# Patient Record
Sex: FEMALE | Race: WHITE
[De-identification: names, ages, dates, MRNs, and addresses within clinical notes are randomized per-mention and may not be internally consistent; named-entity substitution may affect disease eponyms.]

---

## 2020-01-02 ENCOUNTER — HOSPITAL ENCOUNTER (OUTPATIENT)
Dept: HOSPITAL 47 - LABPAT | Age: 60
Discharge: HOME | End: 2020-01-02
Attending: ORTHOPAEDIC SURGERY
Payer: COMMERCIAL

## 2020-01-02 DIAGNOSIS — Z01.818: Primary | ICD-10-CM

## 2020-01-02 DIAGNOSIS — M17.12: ICD-10-CM

## 2020-01-02 DIAGNOSIS — Z01.812: ICD-10-CM

## 2020-01-02 DIAGNOSIS — Z79.01: ICD-10-CM

## 2020-01-02 LAB
ANION GAP SERPL CALC-SCNC: 11 MMOL/L
BASOPHILS # BLD AUTO: 0.1 K/UL (ref 0–0.2)
BASOPHILS NFR BLD AUTO: 1 %
CHLORIDE SERPL-SCNC: 106 MMOL/L (ref 98–107)
CO2 SERPL-SCNC: 24 MMOL/L (ref 22–30)
EOSINOPHIL # BLD AUTO: 0.2 K/UL (ref 0–0.7)
EOSINOPHIL NFR BLD AUTO: 2 %
ERYTHROCYTE [DISTWIDTH] IN BLOOD BY AUTOMATED COUNT: 4.67 M/UL (ref 3.8–5.4)
ERYTHROCYTE [DISTWIDTH] IN BLOOD: 14.1 % (ref 11.5–15.5)
HCT VFR BLD AUTO: 42.3 % (ref 34–46)
HGB BLD-MCNC: 13.4 GM/DL (ref 11.4–16)
INR PPP: 0.9 (ref ?–1.2)
LYMPHOCYTES # SPEC AUTO: 1.8 K/UL (ref 1–4.8)
LYMPHOCYTES NFR SPEC AUTO: 18 %
MCH RBC QN AUTO: 28.7 PG (ref 25–35)
MCHC RBC AUTO-ENTMCNC: 31.7 G/DL (ref 31–37)
MCV RBC AUTO: 90.5 FL (ref 80–100)
MONOCYTES # BLD AUTO: 0.7 K/UL (ref 0–1)
MONOCYTES NFR BLD AUTO: 7 %
NEUTROPHILS # BLD AUTO: 6.9 K/UL (ref 1.3–7.7)
NEUTROPHILS NFR BLD AUTO: 70 %
PLATELET # BLD AUTO: 325 K/UL (ref 150–450)
POTASSIUM SERPL-SCNC: 4.7 MMOL/L (ref 3.5–5.1)
PT BLD: 9.5 SEC (ref 9–12)
SODIUM SERPL-SCNC: 141 MMOL/L (ref 137–145)
WBC # BLD AUTO: 9.8 K/UL (ref 3.8–10.6)

## 2020-01-02 PROCEDURE — 87070 CULTURE OTHR SPECIMN AEROBIC: CPT

## 2020-01-02 PROCEDURE — 36415 COLL VENOUS BLD VENIPUNCTURE: CPT

## 2020-01-02 PROCEDURE — 80051 ELECTROLYTE PANEL: CPT

## 2020-01-02 PROCEDURE — 85025 COMPLETE CBC W/AUTO DIFF WBC: CPT

## 2020-01-02 PROCEDURE — 85610 PROTHROMBIN TIME: CPT

## 2020-01-02 PROCEDURE — 93005 ELECTROCARDIOGRAM TRACING: CPT

## 2020-01-19 NOTE — HP
HISTORY AND PHYSICAL



REASON FOR ADMISSION:

Surgery 01/20/2020



HISTORY OF PRESENT ILLNESS:

Lashanda Hathaway is a 59-year-old patient seen with symptomatic left knee

osteoarthritis.  We discussed options for treatment.  She elected to proceed with left

total knee arthroplasty.  Consent was obtained.  Medical clearance was provided by Dr. Erin Verde.



PAST MEDICAL HISTORY:

Noncontributory.



PAST SURGICAL HISTORY:

Noncontributory.



MEDICATIONS:

Tylenol.



ALLERGIES:

None.



SOCIAL HISTORY:

She denies tobacco use.



PHYSICAL EXAMINATION:

Evaluation of the left knee: Range of motion is -2 to 115.  There is a moderate

effusion present.  Tenderness along the medial and lateral joint lines.  There is

crepitus along the medial, lateral and patellofemoral compartments with range of

motion.  Pain with patellofemoral compression.  Ligaments are stable.  Hip rotation is

without pain.  Distal neurovascular exam is intact.



Left knee radiographs revealed severe/advanced osteoarthritic changes.



IMPRESSION:

Left knee osteoarthritis.



PLAN:

Left total knee arthroplasty.  Surgery scheduled for 01/20/2020.





MMODL / IJN: [id number] / Job#: [id number]

## 2020-01-20 ENCOUNTER — HOSPITAL ENCOUNTER (OUTPATIENT)
Dept: HOSPITAL 47 - OR | Age: 60
Discharge: HOME HEALTH | End: 2020-01-20
Attending: ORTHOPAEDIC SURGERY
Payer: COMMERCIAL

## 2020-01-20 VITALS — DIASTOLIC BLOOD PRESSURE: 55 MMHG | SYSTOLIC BLOOD PRESSURE: 106 MMHG | HEART RATE: 61 BPM

## 2020-01-20 VITALS — BODY MASS INDEX: 30.2 KG/M2

## 2020-01-20 VITALS — TEMPERATURE: 97 F

## 2020-01-20 VITALS — RESPIRATION RATE: 16 BRPM

## 2020-01-20 DIAGNOSIS — Z79.899: ICD-10-CM

## 2020-01-20 DIAGNOSIS — M17.12: Primary | ICD-10-CM

## 2020-01-20 DIAGNOSIS — Z87.891: ICD-10-CM

## 2020-01-20 DIAGNOSIS — I10: ICD-10-CM

## 2020-01-20 PROCEDURE — 76942 ECHO GUIDE FOR BIOPSY: CPT

## 2020-01-20 PROCEDURE — 64448 NJX AA&/STRD FEM NRV NFS IMG: CPT

## 2020-01-20 PROCEDURE — 88300 SURGICAL PATH GROSS: CPT

## 2020-01-20 PROCEDURE — 73560 X-RAY EXAM OF KNEE 1 OR 2: CPT

## 2020-01-20 PROCEDURE — 27447 TOTAL KNEE ARTHROPLASTY: CPT

## 2020-01-20 PROCEDURE — 97161 PT EVAL LOW COMPLEX 20 MIN: CPT

## 2020-01-20 RX ADMIN — HYDROMORPHONE HYDROCHLORIDE PRN MG: 1 INJECTION, SOLUTION INTRAMUSCULAR; INTRAVENOUS; SUBCUTANEOUS at 10:07

## 2020-01-20 RX ADMIN — HYDROMORPHONE HYDROCHLORIDE PRN MG: 1 INJECTION, SOLUTION INTRAMUSCULAR; INTRAVENOUS; SUBCUTANEOUS at 10:12

## 2020-01-20 NOTE — XR
EXAMINATION TYPE: XR knee limited LT

 

DATE OF EXAM: 1/20/2020

 

CLINICAL HISTORY: Left knee pain and arthritis status post total knee replacement.

 

TECHNIQUE:  Portable AP and crosstable lateral views of the left knee are obtained immediately postop
eratively.

 

COMPARISON: Left knee x-rays November 1, 2019

 

FINDINGS:  Metallic hardware from total left knee arthroplasty is seen and appears satisfactory in al
ignment and position.  There is evidence of recent surgery with diffuse subcutaneous gas and soft tis
adelfo swelling noted. 

 

 

IMPRESSION:  METALLIC HARDWARE FROM TOTAL LEFT KNEE ARTHROPLASTY IS SATISFACTORY IN ALIGNMENT.

## 2020-01-20 NOTE — P.OP
Date of Procedure: 01/20/20


Preoperative Diagnosis: 


Left knee osteoarthritis


Postoperative Diagnosis: 


Left knee osteoarthritis


Procedure(s) Performed: 


Left total knee arthroplasty


Implants: 


1.  [place] attune size 4 left cruciate-retaining cemented femur


2.  [place] attune size 4 left cemented tibial baseplate


3.  [place] attune size 4 fixed bearing cruciate retaining 7 mm polyethylene 

tibial insert 


4.  [place] attune 35 mm all polyethylene cemented patella





Anesthesia: GETA, regional (Adductor canal catheter), local


Surgeon: Lazarus Vinson


Assistant #1: Efe Santillan


Estimated Blood Loss (ml): 50


Pathology: other (Bone)


Condition: stable


Disposition: PACU


Indications for Procedure: 


59-year-old patient seen with symptomatic left knee osteoarthritis.  After 

having options regarding treatment discussed, she elected to proceed with total 

knee arthroplasty.


Operative Findings: 


See description of procedure


Description of Procedure: 


Patient was taken to the operative suite after having an adductor canal catheter

placed by the department of anesthesia.  Patient underwent a general anesthetic 

by the department of anesthesia.  Patient was given preoperative IV intake 

antibiotics and TXA.  A well-padded tourniquet was placed about the left lower 

extremity.  The lower extremity was then prepped and draped in the normal 

sterile orthopedic fashion.  The extremity was elevated, a tourniquet was i

nsufflated to 300.  A standard anterior incision was made sharply through skin. 

Dissection was taken down through the subcutaneous soft tissues down to the 

extensor mechanism.  A medial arthrotomy was performed, patella was everted and 

knee was flexed.  There was advanced osteoarthritis noted.  I introduced my 

distal intramedullary femoral drill.  I then introduced the distal femoral 

cutting jig.  Wiliam SMITH secured the cutting jig with 2 pins.  I held 

retractors in position while Wiliam SMITH performed the distal femoral 

resection through the guide area we now removed her distal femoral cutting 

guide.  We now placed our 4-in-1 femoral cutting block and positioned and it was

secured with 2 pins by Wiliam SMITH while I held the block in position.  The 

distal femoral finishing was now completed.  A proximal tibial cutting guide was

positioned.  I held the guide in the appropriate position with both hands well 

Wiliam SMITH inserted stabilizing pins into the guide.  Proximal tibial cut 

was made. We now placed a trial femoral component into position, along with an 

appropriate size tibial tray and insert.  We now took the knee through range of 

motion and had full extension good flexion and good overall soft tissue balance 

noted.  The patella was everted and stabilized with 2 towel clips held by Wiliam SMITH while I performed a flush with patellar quad tendon utilizing a fresh 

sawblade.  We templated the patella, appropriate drill holes were made.  An 

appropriate trial patella was positioned, knee was taken through full range of 

motion with the patella tracking very nicely.  The trial patella was removed.  

Drill holes were made through the femoral component.  All trial components were 

removed after marking off the appropriate rotation of the tibia.  Retractors wer

e now positioned along the proximal tibia.  An appropriate keel punch was made 

with the appropriate size tibial guide by myself on Wiliam SMITH assisted by 

holding retractors.  At this point appropriate size implants were chosen and 

opened.  The joint was irrigated copiously with pulse lavage mechanical 

irrigation.  The posterior capsule was infiltrated with local analgesic.  The 

wound was irrigated with pulse lavage mechanical irrigation.  We mixed 

antibiotic methylmethacrylate.  We placed the knee into flexion.  We placed 

multiple retractors assisted by Wiliam SMITH to expose the proximal tibia.  

Once the methyl methacrylate was ready, the tibial component was cemented into 

place removing any excess methylmethacrylate form by both myself and Wiliam SMITH.  The femoral component was cemented into place removing the removing any 

excess methylmethacrylate performed by both myself and Wiliam SMITH.  We then 

inserted the appropriate size polyethylene tibial insert.  We made sure that it 

was locked into position.  We took the knee into full extension, and then back 

in a flexion making sure we had removed any excess methylmethacrylate.  The 

patellar component was then cemented down and secured with clamp.  Excess 

methylmethacrylate removed.  We kept the knee in full extension, patellar clamp 

in position until methylmethacrylate had hardened.  Once it had hardened the 

patellar clamp was removed.  The knee was taken through full range of motion.  

The patella tracked nicely.  There was good soft tissue balancing.  The 

tourniquet was now released.  Additional hemostasis was achieved via 

electrocautery.  A second gram of TXA was given.  The wound again was irrigated 

with pulse lavage mechanical irrigation.  The superficial soft tissues were 

infiltrated local analgesic.  The extensor mechanism was repaired with Vicryl.  

We checked the repair with range of motion and it was stable.  The subcutaneous 

soft tissues were repaired with Vicryl in layers.  The skin was approximated 

with pernio/Dermabond.  Sterile dressings were applied followed by loose web 

roll and Ace bandage.  The patient was transferred to a bed, and taken to 

recovery in stable and satisfactory condition.  Wiliam SMITH assisted with 

this complex procedure.

## 2020-01-20 NOTE — P.ANPRN
Procedure Note - Anesthesia





- Nerve Block Performed


  ** Left Adductor Canal Infusion


Time Out Performed: Yes


Date of Procedure: 01/20/20


Procedure Start Time: 06:56


Location of Patient: PreOp


Indication: Acute Post-Operative Pain, Requested by Surgeon


Specifically requested for management of pain by DrRox: Lazarus Vinson


Sedation Type: Sedate with meaningful contact maintained


Preparation: Sterile Prep


Position: Supine


Catheter Depth at Skin (cm): 8


Catheter: Indwelling


Needle Types: Pajunk


Needle Gauge: 18


Ultrasound used to visualize needle placement: Yes


Ultrasound used to observe medication spread: Yes


Injectate: 0.5% Ropivacaine (see comment for volume) (20 cc)


Blood Aspirated: No


Pain Paresthesia on Injection Noted: No


Resistance on Injection: Normal


Image Stored and Saved: Yes


Events: Uneventful and Well Tolerated